# Patient Record
Sex: MALE | Race: OTHER | NOT HISPANIC OR LATINO | URBAN - METROPOLITAN AREA
[De-identification: names, ages, dates, MRNs, and addresses within clinical notes are randomized per-mention and may not be internally consistent; named-entity substitution may affect disease eponyms.]

---

## 2020-10-16 ENCOUNTER — EMERGENCY (EMERGENCY)
Facility: HOSPITAL | Age: 50
LOS: 1 days | Discharge: LEFT BEFORE DISCHARGE | End: 2020-10-16
Attending: EMERGENCY MEDICINE | Admitting: EMERGENCY MEDICINE
Payer: COMMERCIAL

## 2020-10-16 VITALS
RESPIRATION RATE: 18 BRPM | HEART RATE: 70 BPM | SYSTOLIC BLOOD PRESSURE: 135 MMHG | DIASTOLIC BLOOD PRESSURE: 81 MMHG | OXYGEN SATURATION: 100 %

## 2020-10-16 VITALS
OXYGEN SATURATION: 98 % | WEIGHT: 139.99 LBS | DIASTOLIC BLOOD PRESSURE: 86 MMHG | RESPIRATION RATE: 18 BRPM | SYSTOLIC BLOOD PRESSURE: 131 MMHG | HEIGHT: 69 IN | TEMPERATURE: 98 F | HEART RATE: 73 BPM

## 2020-10-16 LAB
ALBUMIN SERPL ELPH-MCNC: 4.6 G/DL — SIGNIFICANT CHANGE UP (ref 3.3–5)
ALP SERPL-CCNC: 49 U/L — SIGNIFICANT CHANGE UP (ref 40–120)
ALT FLD-CCNC: 26 U/L — SIGNIFICANT CHANGE UP (ref 10–45)
ANION GAP SERPL CALC-SCNC: 14 MMOL/L — SIGNIFICANT CHANGE UP (ref 5–17)
APTT BLD: 33.8 SEC — SIGNIFICANT CHANGE UP (ref 27.5–35.5)
AST SERPL-CCNC: 23 U/L — SIGNIFICANT CHANGE UP (ref 10–40)
BASOPHILS # BLD AUTO: 0.04 K/UL — SIGNIFICANT CHANGE UP (ref 0–0.2)
BASOPHILS NFR BLD AUTO: 0.7 % — SIGNIFICANT CHANGE UP (ref 0–2)
BILIRUB SERPL-MCNC: 0.8 MG/DL — SIGNIFICANT CHANGE UP (ref 0.2–1.2)
BUN SERPL-MCNC: 17 MG/DL — SIGNIFICANT CHANGE UP (ref 7–23)
CALCIUM SERPL-MCNC: 9.8 MG/DL — SIGNIFICANT CHANGE UP (ref 8.4–10.5)
CHLORIDE SERPL-SCNC: 101 MMOL/L — SIGNIFICANT CHANGE UP (ref 96–108)
CO2 SERPL-SCNC: 28 MMOL/L — SIGNIFICANT CHANGE UP (ref 22–31)
CREAT SERPL-MCNC: 0.84 MG/DL — SIGNIFICANT CHANGE UP (ref 0.5–1.3)
EOSINOPHIL # BLD AUTO: 0.15 K/UL — SIGNIFICANT CHANGE UP (ref 0–0.5)
EOSINOPHIL NFR BLD AUTO: 2.6 % — SIGNIFICANT CHANGE UP (ref 0–6)
ETHANOL SERPL-MCNC: <10 MG/DL — SIGNIFICANT CHANGE UP (ref 0–10)
GLUCOSE SERPL-MCNC: 98 MG/DL — SIGNIFICANT CHANGE UP (ref 70–99)
HCT VFR BLD CALC: 43.9 % — SIGNIFICANT CHANGE UP (ref 39–50)
HGB BLD-MCNC: 14.9 G/DL — SIGNIFICANT CHANGE UP (ref 13–17)
IMM GRANULOCYTES NFR BLD AUTO: 0.2 % — SIGNIFICANT CHANGE UP (ref 0–1.5)
INR BLD: 1.11 — SIGNIFICANT CHANGE UP (ref 0.88–1.16)
LYMPHOCYTES # BLD AUTO: 2.21 K/UL — SIGNIFICANT CHANGE UP (ref 1–3.3)
LYMPHOCYTES # BLD AUTO: 38.1 % — SIGNIFICANT CHANGE UP (ref 13–44)
MCHC RBC-ENTMCNC: 30.2 PG — SIGNIFICANT CHANGE UP (ref 27–34)
MCHC RBC-ENTMCNC: 33.9 GM/DL — SIGNIFICANT CHANGE UP (ref 32–36)
MCV RBC AUTO: 89 FL — SIGNIFICANT CHANGE UP (ref 80–100)
MONOCYTES # BLD AUTO: 0.42 K/UL — SIGNIFICANT CHANGE UP (ref 0–0.9)
MONOCYTES NFR BLD AUTO: 7.2 % — SIGNIFICANT CHANGE UP (ref 2–14)
NEUTROPHILS # BLD AUTO: 2.97 K/UL — SIGNIFICANT CHANGE UP (ref 1.8–7.4)
NEUTROPHILS NFR BLD AUTO: 51.2 % — SIGNIFICANT CHANGE UP (ref 43–77)
NRBC # BLD: 0 /100 WBCS — SIGNIFICANT CHANGE UP (ref 0–0)
PLATELET # BLD AUTO: 226 K/UL — SIGNIFICANT CHANGE UP (ref 150–400)
POTASSIUM SERPL-MCNC: 4 MMOL/L — SIGNIFICANT CHANGE UP (ref 3.5–5.3)
POTASSIUM SERPL-SCNC: 4 MMOL/L — SIGNIFICANT CHANGE UP (ref 3.5–5.3)
PROT SERPL-MCNC: 7.6 G/DL — SIGNIFICANT CHANGE UP (ref 6–8.3)
PROTHROM AB SERPL-ACNC: 13.3 SEC — SIGNIFICANT CHANGE UP (ref 10.6–13.6)
RBC # BLD: 4.93 M/UL — SIGNIFICANT CHANGE UP (ref 4.2–5.8)
RBC # FLD: 12.9 % — SIGNIFICANT CHANGE UP (ref 10.3–14.5)
SODIUM SERPL-SCNC: 143 MMOL/L — SIGNIFICANT CHANGE UP (ref 135–145)
TROPONIN T SERPL-MCNC: <0.01 NG/ML — SIGNIFICANT CHANGE UP (ref 0–0.01)
WBC # BLD: 5.8 K/UL — SIGNIFICANT CHANGE UP (ref 3.8–10.5)
WBC # FLD AUTO: 5.8 K/UL — SIGNIFICANT CHANGE UP (ref 3.8–10.5)

## 2020-10-16 PROCEDURE — 93010 ELECTROCARDIOGRAM REPORT: CPT | Mod: NC

## 2020-10-16 PROCEDURE — 93005 ELECTROCARDIOGRAM TRACING: CPT

## 2020-10-16 PROCEDURE — 85610 PROTHROMBIN TIME: CPT

## 2020-10-16 PROCEDURE — 99285 EMERGENCY DEPT VISIT HI MDM: CPT | Mod: 25

## 2020-10-16 PROCEDURE — 85730 THROMBOPLASTIN TIME PARTIAL: CPT

## 2020-10-16 PROCEDURE — 80307 DRUG TEST PRSMV CHEM ANLYZR: CPT

## 2020-10-16 PROCEDURE — 36415 COLL VENOUS BLD VENIPUNCTURE: CPT

## 2020-10-16 PROCEDURE — 99285 EMERGENCY DEPT VISIT HI MDM: CPT

## 2020-10-16 PROCEDURE — 82962 GLUCOSE BLOOD TEST: CPT

## 2020-10-16 PROCEDURE — 84484 ASSAY OF TROPONIN QUANT: CPT

## 2020-10-16 PROCEDURE — 85025 COMPLETE CBC W/AUTO DIFF WBC: CPT

## 2020-10-16 PROCEDURE — 80053 COMPREHEN METABOLIC PANEL: CPT

## 2020-10-16 NOTE — ED ADULT NURSE NOTE - CHPI ED NUR SYMPTOMS NEG
no purulent drainage/no discharge/no foreign body/no pain/no photophobia/no double vision/no drainage/no eye lid swelling/no itching

## 2020-10-16 NOTE — ED PROVIDER NOTE - OBJECTIVE STATEMENT
50 year old male with history of "retinal problems" with baseline floaters, cataracts presents to ED with concern for blurred vision that began one hour ago while  working on his computer.  Patient feels symptoms are improved at this time.  He also notes associated "small white light" at time of symptom onset, which is also now resolved.  Patient wears glasses at baseline.  He denies associated nausea, emesis, fever, chills, headache, chest pain, shortness of breath, abdominal pain, changes to bowel movements, rashes, recent travel, known sick contacts or any additional acute complaints or concerns at this time.

## 2020-10-16 NOTE — STROKE CODE NOTE - ER ARRIVAL: DATE/TIME
Patient Name:  Kye Corral  MR#:  [id number]  : 
16-Oct-2020 16:47

## 2020-10-16 NOTE — CONSULT NOTE ADULT - SUBJECTIVE AND OBJECTIVE BOX
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 All symptoms are now resolved and vision is at baseline. He has many floaters at baseline which are unchanged. He has annual dilated exams at [facility] where he lives and retina exams have been normal. He denies pain, LOV, visual field cuts, curtains or shadows. He has had ocular migraines in the past but feels this was different.    Va(cc) at near: 20/20 OD, 20/20 OS  P RR no APD  EOM full OU  CVF full OU    SLE:  LA wnl OU  CS WQ OU  K clear OU  AC DQ OU  I RR OU  Tpalp soft OU    DFE:  L trace NS OU, small central PSC OD>OS  V clear OU, negative Schafers sign  D 0.3 spf OU  M flat OU  V: wnl OU  P flat 360 OU    A/P: 51 yo M with episode of blurry vision/flickering lights OS earlier today. Dilated fundoscopic exam normal, no retinal holes/tears.  Unclear etiology of symptoms- possible variant of ocular migraine vs photopsias. Pt reassured - recommend further evaluation with outpatient ophthalmologist if symptoms recur. Retinal detachment precautions reviewed.    Tereza oRss MD
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 The patient states that one hour ago at 1600 he experienced left eye blurry vision and noticed that he had a flashing light that lasted for about ten minutes. He states that at baseline as a result of his posterior subcapsular cataracts, he sees halos and has floaters that appears usually about once a year which have worsened over the last ten years. He states that the cataract in his right eye is worse than the left, therefore, he usually has worsened vision in his right eye. In addition, he states that he has had flashing in his eyes before, however, states that it usually resolves within a minute, however, this lasted for about 10 minutes.   States that he no longer has flashing lights and thinks that his blurry vision in the left eye has improved, however, it is difficulty for him to tell.   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  Denies acute onset of numbness, weakness, tingling, slurred speech, double vision, dizziness, headache.      T(C): 36.7 (10-16-20 @ 16:47), Max: 36.7 (10-16-20 @ 16:47)  HR: 70 (10-16-20 @ 17:09) (70 - 73)  BP: 135/81 (10-16-20 @ 17:09) (131/86 - 135/81)  RR: 18 (10-16-20 @ 17:09) (18 - 18)  SpO2: 100% (10-16-20 @ 17:09) (98% - 100%)    PAST MEDICAL & SURGICAL HISTORY:  Retinal detachment    Cataract    No significant past surgical history        FAMILY HISTORY:      SOCIAL HISTORY:  former smoker, quit at age 27  former alcohol use, quit about 2 years ago   denies drug use    ROS:  as per HPI otherwise negative    MEDICATIONS  (STANDING):    MEDICATIONS  (PRN):    Allergies    Gluten (Unknown)  No Known Drug Allergies    Intolerances      Vital Signs Last 24 Hrs  T(C): 36.7 (16 Oct 2020 16:47), Max: 36.7 (16 Oct 2020 16:47)  T(F): 98 (16 Oct 2020 16:47), Max: 98 (16 Oct 2020 16:47)  HR: 70 (16 Oct 2020 17:09) (70 - 73)  BP: 135/81 (16 Oct 2020 17:09) (131/86 - 135/81)  BP(mean): --  RR: 18 (16 Oct 2020 17:09) (18 - 18)  SpO2: 100% (16 Oct 2020 17:09) (98% - 100%)    Physical exam:  General: No acute distress, awake and alert  Cardiovascular: Regular rate and rhythm, no murmurs, rubs, or gallops. No bruits  Pulmonary: Anterior breath sounds clear bilaterally, no crackles or wheezing. No use of accessory muscles  Extremities: Radial and DP pulses +2, no edema    Neurologic:  -Mental status: Awake, alert, oriented to person, place, and time. Speech is fluent with intact naming, repetition, and comprehension, no dysarthria. Recent and remote memory intact. Follows commands. Attention/concentration intact. Fund of knowledge appropriate.  -Cranial nerves:   II: Visual fields are full to confrontation.  III, IV, VI: Extraocular movements are intact without nystagmus. Pupils equally round and reactive to light  V:  Facial sensation V1-V3 equal and intact   VII: Face is symmetric with normal eye closure and smile  VIII: Hearing is bilaterally intact to finger rub  IX, X: Uvula is midline and soft palate rises symmetrically  XI: Head turning and shoulder shrug are intact.  XII: Tongue protrudes midline  Motor: Normal bulk and tone. No pronator drift. Strength bilateral upper extremity 5/5, bilateral lower extremities 5/5.  Sensation: Intact to light touch bilaterally. No neglect or extinction on double simultaneous testing.  Coordination: No dysmetria on finger-to-nose     NIHSS: 0    Fingerstick Blood Glucose: CAPILLARY BLOOD GLUCOSE  103 (16 Oct 2020 17:40)      POCT Blood Glucose.: 103 mg/dL (16 Oct 2020 16:48)    LABS:                        14.9   5.80  )-----------( 226      ( 16 Oct 2020 17:02 )             43.9     10-16    143  |  101  |  17  ----------------------------<  98  4.0   |  28  |  0.84    Ca    9.8      16 Oct 2020 17:02    TPro  7.6  /  Alb  4.6  /  TBili  0.8  /  DBili  x   /  AST  23  /  ALT  26  /  AlkPhos  49  10-16    PT/INR - ( 16 Oct 2020 17:02 )   PT: 13.3 sec;   INR: 1.11          PTT - ( 16 Oct 2020 17:02 )  PTT:33.8 sec  CARDIAC MARKERS ( 16 Oct 2020 17:02 )  x     / <0.01 ng/mL / x     / x     / x              RADIOLOGY & ADDITIONAL STUDIES:    - Patient adamantly refused all CT scans due to fear of radiation  -----------------------------------------------------------------------------------------------------------------  IV-tPA (Y/N):  no                         Reason IV-tPA not given: patient refused CT scan    ASSESSMENT/PLAN:   50y Male with PMHx of celiac disease, 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 Patient refused CT scan. Risks of refusing CT scan was explained by stroke ACP and ED attending, patient states that he understands the risk and would like to proceed with retinal exam only.   Due to history and neurological exam, it is unlikely that patient is having a stroke, more likely primary opthalmologic etiology, therefore recommend optho consult/retinal exam.

## 2020-10-16 NOTE — ED PROVIDER NOTE - NS ED ROS FT
Constitutional: No fever or chills.   Eyes: No pain, + blurry vision, no discharge.  ENMT: No hearing changes, pain, discharge or infections. No neck pain or stiffness.  Cardiac: No chest pain, SOB or edema. No chest pain with exertion.  Respiratory: No cough or respiratory distress. No hemoptysis. No history of asthma or RAD.  GI: No nausea, vomiting, diarrhea or abdominal pain.  : No dysuria, frequency or burning.  MS: No myalgia, muscle weakness, joint pain or back pain.  Neuro: No headache or weakness. No LOC.  Skin: No skin rash.   Endocrine: No history of thyroid disease or diabetes.  Except as documented in the HPI, all other systems are negative.

## 2020-10-16 NOTE — ED ADULT NURSE NOTE - OBJECTIVE STATEMENT
patient alert and oriented x 3 came c/o left eye blurry vision and floaters started 1 hr ago while working from home . History of retinal detachment and cataracts before . Stated he experienced the same symptoms before but not blurry vision . Pt. was upgraded to Dr. Pompa , Stroke code was called , neuro team came , patient refused Ct scan  stated " I don't want ct scan , I know what is it and I don't like it " Pt. denies any unilateral weakness , facial droop , slurred speech nor arm drift noted . All labs sent . Awaiting for opthalmology consult . Will continue to monitor .

## 2020-10-16 NOTE — ED PROVIDER NOTE - CLINICAL SUMMARY MEDICAL DECISION MAKING FREE TEXT BOX
Patient in ED w concern for transient change in vision today while working at his computer.  Patient notes flickering of light to left eye and blurred vision which is now resolved.  Code stroke is called on arrival to ED given concern for possible TIA/stroke symptoms, however patient refusing CT imaging.  Stroke team consults and without further recommendation as patient refusing imaging.  Optho in ED to evaluate and no further ophtho recs.  Patient is aware of incomplete ED evaluation given his refusal of CT scans and continues to refuse.  No further consultant recommendations.  Labs wnl.   The patient has decided to leave against medical advice.  The patient is AAOx3, not intoxicated, and displays normal decision making ability. We discussed all risks, benefits, and alternatives to the progression of treatment and the potential dangers of leaving including but not limited to permanent disability, injury, and death.  The patient was instructed that they are welcome to change their decision to leave against medical advice and return to the emergency department at any time and for any reason in order to allow us to render care.

## 2020-10-16 NOTE — ED PROVIDER NOTE - PATIENT PORTAL LINK FT
You can access the FollowMyHealth Patient Portal offered by [facility] by registering at the following website: [email or web address]. By joining [facility]’s FollowMyHealth portal, you will also be able to view your health information using other applications (apps) compatible with our system.

## 2020-10-16 NOTE — ED ADULT TRIAGE NOTE - CHIEF COMPLAINT QUOTE
blurry vision and flashes of light with floaters to left eye starting @ 1540 today; denies focal numbness/weakness; ambulatory with steady gait; speech clear

## 2020-10-20 DIAGNOSIS — H53.9 UNSPECIFIED VISUAL DISTURBANCE: ICD-10-CM

## 2020-10-20 DIAGNOSIS — Z91.018 ALLERGY TO OTHER FOODS: ICD-10-CM

## 2021-03-20 PROBLEM — H26.9 UNSPECIFIED CATARACT: Chronic | Status: ACTIVE | Noted: 2020-10-16

## 2021-03-20 PROBLEM — H33.20 SEROUS RETINAL DETACHMENT, UNSPECIFIED EYE: Chronic | Status: ACTIVE | Noted: 2020-10-16

## 2021-05-04 PROBLEM — Z00.00 ENCOUNTER FOR PREVENTIVE HEALTH EXAMINATION: Status: ACTIVE | Noted: 2021-05-04

## 2021-07-07 ENCOUNTER — APPOINTMENT (OUTPATIENT)
Dept: OPHTHALMOLOGY | Facility: CLINIC | Age: 51
End: 2021-07-07

## 2022-03-09 ENCOUNTER — NON-APPOINTMENT (OUTPATIENT)
Age: 52
End: 2022-03-09

## 2022-03-09 ENCOUNTER — APPOINTMENT (OUTPATIENT)
Dept: OPHTHALMOLOGY | Facility: CLINIC | Age: 52
End: 2022-03-09
Payer: COMMERCIAL

## 2022-03-09 PROCEDURE — 92004 COMPRE OPH EXAM NEW PT 1/>: CPT

## 2023-05-10 ENCOUNTER — NON-APPOINTMENT (OUTPATIENT)
Age: 53
End: 2023-05-10

## 2023-05-10 ENCOUNTER — APPOINTMENT (OUTPATIENT)
Dept: OPHTHALMOLOGY | Facility: CLINIC | Age: 53
End: 2023-05-10
Payer: COMMERCIAL

## 2023-05-10 PROCEDURE — 92014 COMPRE OPH EXAM EST PT 1/>: CPT

## 2023-08-01 ENCOUNTER — NON-APPOINTMENT (OUTPATIENT)
Age: 53
End: 2023-08-01

## 2023-08-01 ENCOUNTER — TRANSCRIPTION ENCOUNTER (OUTPATIENT)
Age: 53
End: 2023-08-01

## 2023-08-01 ENCOUNTER — APPOINTMENT (OUTPATIENT)
Dept: OPHTHALMOLOGY | Facility: CLINIC | Age: 53
End: 2023-08-01
Payer: SELF-PAY

## 2023-08-01 PROCEDURE — 92015 DETERMINE REFRACTIVE STATE: CPT
